# Patient Record
Sex: FEMALE | Race: WHITE | NOT HISPANIC OR LATINO | URBAN - METROPOLITAN AREA
[De-identification: names, ages, dates, MRNs, and addresses within clinical notes are randomized per-mention and may not be internally consistent; named-entity substitution may affect disease eponyms.]

---

## 2017-02-21 NOTE — PATIENT DISCUSSION
(H00.12) Chalazion right lower eyelid - Assesment : Examination revealed Chalazion RLL- not improving. - Plan : Chalazion I+ D performed today in minor surgery. Monitor for changes. Pt to call with increased symptoms, decreased vision, signs of infection, or other problems. Maxitrol graciela to RLL QHS x 4 nights. RTC as scheduled, or sooner for problems.

## 2017-06-30 NOTE — PATIENT DISCUSSION
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 - Plan : Monitor for changes.

## 2017-06-30 NOTE — PATIENT DISCUSSION
(T15.11XA) Foreign body in conjunctival sac, right eye, init encntr - Assesment : Examination revealed foreign body OD. Patient has dog at home. - Plan : Pet hair removed OD at slit lamp with q tip. Monitor for changes.

## 2017-06-30 NOTE — PATIENT DISCUSSION
(H10.45) Other chronic allergic conjunctivitis - Assesment : Examination revealed Allergic Conjunctivitis OU. - Plan : Recommend Alrex BID OU for 1 month and artificial tears 3-4 times daily OU to help wash out allergens. Monitor for changes. Keep scheduled appt.

## 2017-07-19 NOTE — PATIENT DISCUSSION
(O14.162) Keratoconjunct sicca, not specified as Sjogren's, bilateral - Assesment : Examination revealed Dry Eye Syndrome - Plan : Monitor for changes. ATs recommended daily.

## 2017-07-19 NOTE — PATIENT DISCUSSION
(H40.013) Open angle with borderline findings, low risk, bilateral - Assesment : Examination revealed suspicion for Open Angle Glaucoma. - Plan : Monitor for IOP and NFL changes with visits and testing. Advised patient to call our office when decreased vision or increased eye pain. RTC in 1 year for Exam and OCT ONH, sooner if problems or changes occur.

## 2017-07-19 NOTE — PATIENT DISCUSSION
(H25.13) Age-related nuclear cataract, bilateral - Assesment : Examination revealed cataract. - Plan : Monitor for changes. Advised patient to call our office with decreased vision or increased symptoms.

## 2017-08-30 ENCOUNTER — IMPORTED ENCOUNTER (OUTPATIENT)
Dept: URBAN - METROPOLITAN AREA CLINIC 1 | Facility: CLINIC | Age: 62
End: 2017-08-30

## 2017-08-30 PROBLEM — H25.813: Noted: 2017-08-30

## 2017-08-30 PROBLEM — H04.123: Noted: 2017-08-30

## 2017-08-30 PROBLEM — H43.811: Noted: 2017-08-30

## 2017-08-30 PROBLEM — H16.143: Noted: 2017-08-30

## 2017-08-30 PROBLEM — H40.023: Noted: 2017-08-30

## 2017-08-30 PROCEDURE — 92015 DETERMINE REFRACTIVE STATE: CPT

## 2017-08-30 PROCEDURE — 92014 COMPRE OPH EXAM EST PT 1/>: CPT

## 2017-08-30 PROCEDURE — 92133 CPTRZD OPH DX IMG PST SGM ON: CPT

## 2017-08-30 NOTE — PATIENT DISCUSSION
1.  LTG Suspect OU (CD 0.65/0.75): Family hx. OCT stable normal OD minimal temporal RNFL thinning OS. Patient is considered high risk. Condition was discussed with patient and patient understands. Will continue to monitor patient for any progression in condition. Patient was advised to call us with any problems questions or concerns. 2.  TAMMIE w/ PEK OU-The use/continuation of artificial tears were recommended. 3.  Cataract OU: Observe for now without intervention. The patient was advised to contact us if any change or worsening of vision4. PVD w/o Tear OD - RD precautions. MRX for glasses givenReturn for an appointment in 1 week cc with Dr. Domitila Celestin. Return for an appointment in 6 months 10/HVF with Dr. Domitila Celestin.

## 2017-09-05 ENCOUNTER — IMPORTED ENCOUNTER (OUTPATIENT)
Dept: URBAN - METROPOLITAN AREA CLINIC 1 | Facility: CLINIC | Age: 62
End: 2017-09-05

## 2018-01-08 NOTE — PATIENT DISCUSSION
(H10.45) Other chronic allergic conjunctivitis - Assesment : Hx of Allergic Conjunctivitis OU. Allergy testing today. Pt reports some improvement in symptoms. - Plan : Recommended OTC Zaditor to use prn for itching. ATs 2-4 times per day and prn. Refresh coupon given today. RTC as scheduled, sooner if problems or changes.

## 2018-01-08 NOTE — PATIENT DISCUSSION
(H00.015) Hordeolum externum left lower eyelid - Assesment : Examination revealed Hordeolum Externum LLL. - Plan : Start Erythromycin graciela to LLL bid OS for 1 week. E-Rx sent o pharmacy. Hot compresses as often as possible. Call if worsens, does not improve or new symptoms or vision changes occur.

## 2018-09-12 ENCOUNTER — IMPORTED ENCOUNTER (OUTPATIENT)
Dept: URBAN - METROPOLITAN AREA CLINIC 1 | Facility: CLINIC | Age: 63
End: 2018-09-12

## 2018-09-12 PROBLEM — H40.023: Noted: 2018-09-12

## 2018-09-12 PROBLEM — H25.813: Noted: 2018-09-12

## 2018-09-12 PROBLEM — H43.811: Noted: 2018-09-12

## 2018-09-12 PROBLEM — H04.123: Noted: 2018-09-12

## 2018-09-12 PROBLEM — H16.143: Noted: 2018-09-12

## 2018-09-12 PROCEDURE — 92014 COMPRE OPH EXAM EST PT 1/>: CPT

## 2018-09-12 NOTE — PATIENT DISCUSSION
1.  LTG Suspect OU (CD 0.65/0.75): Family hx. Patient is considered high risk. Condition was discussed with patient and patient understands. Will continue to monitor patient for any progression in condition. Patient was advised to call us with any problems questions or concerns. 2.  TAMMIE w/ PEK OU- Recommend ATs TID OU routinely 3. Cataract OU: Observe for now without intervention. The patient was advised to contact us if any change or worsening of vision4. PVD w/o Tear OD - RD precautions. Return for an appointment in 1-2 week 40/cc with Dr. Trey Nagy. Return for an appointment in 1 year 30/OCT/HVF with Dr. Trey Nagy.

## 2018-09-17 ENCOUNTER — IMPORTED ENCOUNTER (OUTPATIENT)
Dept: URBAN - METROPOLITAN AREA CLINIC 1 | Facility: CLINIC | Age: 63
End: 2018-09-17

## 2018-09-17 PROBLEM — H52.4: Noted: 2018-09-17

## 2018-09-17 PROBLEM — H52.03: Noted: 2018-09-17

## 2018-09-17 PROCEDURE — S0621 ROUTINE OPHTHALMOLOGICAL EXA: HCPCS

## 2018-09-17 NOTE — PATIENT DISCUSSION
1.  Hyperopia OU -- Finalized Glasses MRx was given to patient today for corrective spectacles if indicated2. Presbyopia OU 3. LTG Suspect OU (CD: 0.65 / 0.75) -- IOP stable today at 13 OU. Positive Family h/o Glaucoma. Patient is considered high risk. Condition was discussed with patient and patient understands. Will continue to monitor patient for any progression in condition. Patient was advised to call us with any problems questions or concerns4. TAMMIE w/ PEK OU -- Recommend the frequent use of OTC AT's BID-QID OU5. Cataracts OU -- Observe for now without intervention. The patient was advised to contact us if any change or worsening of vision6. PVD w/o Tear OD -- RD precautions givenReturn for an appointment in 1 WK for a CC OU with Dr. Xena Perez. Trial CTL given to patient today (Checked BCVA w/ CTL: 20 / 25 & J1 OU).

## 2018-09-26 ENCOUNTER — IMPORTED ENCOUNTER (OUTPATIENT)
Dept: URBAN - METROPOLITAN AREA CLINIC 1 | Facility: CLINIC | Age: 63
End: 2018-09-26

## 2018-09-26 NOTE — PATIENT DISCUSSION
1.  CC today - Good fit good comfort. Changes made and finalized CTL RXReturn for an appointment in 1 year 40/cc with Dr. Jah Payan.

## 2019-10-07 ENCOUNTER — IMPORTED ENCOUNTER (OUTPATIENT)
Dept: URBAN - METROPOLITAN AREA CLINIC 1 | Facility: CLINIC | Age: 64
End: 2019-10-07

## 2019-10-07 PROBLEM — H04.123: Noted: 2019-10-07

## 2019-10-07 PROBLEM — H25.813: Noted: 2019-10-07

## 2019-10-07 PROBLEM — H16.143: Noted: 2019-10-07

## 2019-10-07 PROBLEM — H43.813: Noted: 2019-10-07

## 2019-10-07 PROBLEM — H40.013: Noted: 2019-10-07

## 2019-10-07 PROCEDURE — 92014 COMPRE OPH EXAM EST PT 1/>: CPT

## 2019-10-07 PROCEDURE — 92083 EXTENDED VISUAL FIELD XM: CPT

## 2019-10-07 PROCEDURE — 92133 CPTRZD OPH DX IMG PST SGM ON: CPT

## 2019-10-07 NOTE — PATIENT DISCUSSION
1.  LTG Suspect OU -- (CD 0.650.75) IOP stable. OCT/24-2 HVF WNL OU. Family hx. Patient is considered Low Risk. Condition was discussed with patient and patient understands. Will continue to monitor patient for any progression in condition. Patient was advised to call us with any problems questions or concerns. 2.  PVD OU -- New OS Old OD. Reassurance. RD precautions given. 3.  Cataract OU -- Observe for now without intervention. The patient was advised to contact us if any change or worsening of vision4. TAMMIE w/ PEK OU -- Recommend ATs TID OU routinely. Patient defers MRx today. Will return under vision plan with RBF. Return for Next Available appointment for 40/cc with Dr. Kelby Li.

## 2019-10-16 ENCOUNTER — IMPORTED ENCOUNTER (OUTPATIENT)
Dept: URBAN - METROPOLITAN AREA CLINIC 1 | Facility: CLINIC | Age: 64
End: 2019-10-16

## 2019-10-16 PROBLEM — H52.221: Noted: 2019-10-16

## 2019-10-16 PROBLEM — H52.4: Noted: 2019-10-16

## 2019-10-16 PROBLEM — H52.03: Noted: 2019-10-16

## 2019-10-16 PROCEDURE — S0621 ROUTINE OPHTHALMOLOGICAL EXA: HCPCS

## 2019-10-16 NOTE — PATIENT DISCUSSION
1.  Hyperopia -- Rx was given for corrective spectacles if indicated. 2.  [id number]. Cataract OU -- Observe. 4. TAMMIE w/ PEK OU -- Continue ATs TID OU routinely (Sample of Refresh Relieva given). May use Lumify PRN for redness. 5. LTG Suspect OU -- (CD 0.650.75) IOP stable. Past w/u negative. Family hx. Patient is considered Low Risk. 6.  H/o PVD OU -- New OS Old OD. Reassurance. RD precautions given. Finalized CTL Rx today. Return for an appointment in 1 year for a 40/cc with Dr. Radha Jaimes. Return for an appointment in 1 year for a 30/OCT/glare with Dr. Mira Reina.

## 2020-11-09 ENCOUNTER — IMPORTED ENCOUNTER (OUTPATIENT)
Dept: URBAN - METROPOLITAN AREA CLINIC 1 | Facility: CLINIC | Age: 65
End: 2020-11-09

## 2020-11-09 PROBLEM — H04.123: Noted: 2020-11-09

## 2020-11-09 PROBLEM — H43.813: Noted: 2020-11-09

## 2020-11-09 PROBLEM — H40.023: Noted: 2020-11-09

## 2020-11-09 PROBLEM — H16.143: Noted: 2020-11-09

## 2020-11-09 PROBLEM — H25.813: Noted: 2020-11-09

## 2020-11-09 PROCEDURE — 92014 COMPRE OPH EXAM EST PT 1/>: CPT

## 2020-11-09 PROCEDURE — 92133 CPTRZD OPH DX IMG PST SGM ON: CPT

## 2020-11-09 NOTE — PATIENT DISCUSSION
1.  LTG Suspect OU (CD 0.65/0.75): Slight progression shown on OCT. IOP stable 11 OU. Family hx (Grandmother). Patient is considered high risk. Condition was discussed with patient and patient understands. Will continue to monitor patient for any progression in condition. Patient was advised to call us with any problems questions or concerns. 2.  Cataract OU: Observe for now without intervention. The patient was advised to contact us if any change or worsening of vision3. TAMMIE w/ PEK OU- Recommend ATs TID OU routinely 4. PVD OU - Symptomatic. No holes tears or detachments noted on today's exam. RD precautions. 5.  H/o Ocular Migraine - Discussed triggers such as stress dietary component (caffeine MSG). Advised to follow up with PCP if symptoms reoccur/worsenReturn for an appointment in 1 month 40/cc with Dr. Tata Tucker. Return for an appointment in 6 months 10/HVF with Dr. Tata Tucker.

## 2020-11-11 ENCOUNTER — IMPORTED ENCOUNTER (OUTPATIENT)
Dept: URBAN - METROPOLITAN AREA CLINIC 1 | Facility: CLINIC | Age: 65
End: 2020-11-11

## 2020-11-11 PROBLEM — H52.4: Noted: 2020-11-11

## 2020-11-11 PROBLEM — H52.03: Noted: 2020-11-11

## 2020-11-11 PROCEDURE — S0621 ROUTINE OPHTHALMOLOGICAL EXA: HCPCS

## 2021-12-16 ENCOUNTER — IMPORTED ENCOUNTER (OUTPATIENT)
Dept: URBAN - METROPOLITAN AREA CLINIC 1 | Facility: CLINIC | Age: 66
End: 2021-12-16

## 2021-12-16 PROBLEM — H52.13: Noted: 2021-12-16

## 2021-12-16 PROBLEM — H52.03: Noted: 2021-12-16

## 2021-12-16 PROBLEM — H52.223: Noted: 2021-12-16

## 2021-12-16 PROBLEM — H52.4: Noted: 2021-12-16

## 2021-12-16 PROCEDURE — S0621 ROUTINE OPHTHALMOLOGICAL EXA: HCPCS

## 2021-12-16 NOTE — PATIENT DISCUSSION
1.  Hyperopia with Astigmatism OU -- Rx was given for corrective spectacles if indicated. 2.  [id number]. LTG Suspect OU (CD 0.65/0.75) -- IOP stable OU. Family hx (Grandmother). Patient is considered high risk. Condition was discussed with patient and patient understands. Will continue to monitor patient for any progression in condition. Patient was advised to call us with any problems questions or concerns. 4.  Cataract OU -- Observe for now without intervention. The patient was advised to contact us if any change or worsening of vision5. TAMMIE w/ PEK OU -- Recommend ATs TID OU routinely 6. PVD OU -- Stable. No holes tears or detachments noted on today's exam. RD precautions. 7.  H/o Ocular Migraine -- Discussed triggers such as stress dietary component (caffeine MSG). Advised to follow up with PCP if symptoms reoccur/worsenCTL Rx finalized and given to pt. Return for an appointment as scheduled with Dr. Eddy Campa.

## 2022-04-08 ASSESSMENT — VISUAL ACUITY
OD_SC: 20/20
OS_SC: 20/20
OD_SC: 20/25+1
OU_SC: 20/20
OS_SC: 20/25
OS_GLARE: 20/60
OS_CC: J1+
OD_CC: J1
OS_CC: J1+
OS_GLARE: 20/100
OS_CC: J1+
OS_SC: 20/20
OS_CC: J1
OS_CC: J1
OS_SC: 20/25+2
OS_SC: 20/20
OD_SC: 20/20
OD_SC: 20/20
OS_CC: J1
OU_CC: J1+-1
OD_CC: 20/50
OD_GLARE: 20/100
OS_SC: 20/30
OD_SC: 20/20
OD_CC: J1
OD_SC: 20/20-1
OS_SC: 20/25+1
OS_CC: J1
OU_CC: J1+
OD_CC: J1+
OD_CC: J1
OS_SC: 20/25
OD_CC: J1
OD_GLARE: 20/60
OS_CC: 20/50
OD_GLARE: 20/60
OS_GLARE: 20/60
OD_SC: 20/20
OD_SC: 20/30
OS_SC: 20/20-2
OD_CC: J1+
OD_SC: 20/25
OU_SC: 20/25+2

## 2022-04-08 ASSESSMENT — KERATOMETRY
OS_K2POWER_DIOPTERS: 45.50
OS_AXISANGLE2_DEGREES: 050
OS_K1POWER_DIOPTERS: 46.00
OS_AXISANGLE_DEGREES: 140
OD_K2POWER_DIOPTERS: 44.75
OD_AXISANGLE2_DEGREES: 108
OD_K1POWER_DIOPTERS: 45.75
OD_AXISANGLE_DEGREES: 018

## 2022-04-08 ASSESSMENT — TONOMETRY
OS_IOP_MMHG: 13
OD_IOP_MMHG: 13
OD_IOP_MMHG: 11
OS_IOP_MMHG: 11
OS_IOP_MMHG: 11
OS_IOP_MMHG: 13
OD_IOP_MMHG: 11
OD_IOP_MMHG: 13
OS_IOP_MMHG: 13
OS_IOP_MMHG: 11
OD_IOP_MMHG: 11
OD_IOP_MMHG: 11
OD_IOP_MMHG: 13
OS_IOP_MMHG: 13
OS_IOP_MMHG: 10
OD_IOP_MMHG: 13

## 2022-05-02 ENCOUNTER — COMPREHENSIVE EXAM (OUTPATIENT)
Dept: URBAN - METROPOLITAN AREA CLINIC 1 | Facility: CLINIC | Age: 67
End: 2022-05-02

## 2022-05-02 DIAGNOSIS — H16.143: ICD-10-CM

## 2022-05-02 DIAGNOSIS — H43.813: ICD-10-CM

## 2022-05-02 DIAGNOSIS — H40.023: ICD-10-CM

## 2022-05-02 DIAGNOSIS — H25.813: ICD-10-CM

## 2022-05-02 DIAGNOSIS — H04.123: ICD-10-CM

## 2022-05-02 DIAGNOSIS — M06.9: ICD-10-CM

## 2022-05-02 DIAGNOSIS — Z79.899: ICD-10-CM

## 2022-05-02 PROCEDURE — 99214 OFFICE O/P EST MOD 30 MIN: CPT

## 2022-05-02 PROCEDURE — 92015 DETERMINE REFRACTIVE STATE: CPT

## 2022-05-02 PROCEDURE — 92133 CPTRZD OPH DX IMG PST SGM ON: CPT

## 2022-05-02 ASSESSMENT — VISUAL ACUITY
OS_BAT: 20/70
OD_CC: 20/20
OD_BAT: 20/70
OD_CC: J1+
OS_CC: 20/20
OS_CC: J1+

## 2022-05-02 ASSESSMENT — TONOMETRY
OS_IOP_MMHG: 15
OD_IOP_MMHG: 15

## 2022-05-02 NOTE — PATIENT DISCUSSION
(CD 0.65/0.75) - IOP stable 15 OU. OCT w/o progression OU. (+) Family hx (Grandmother). Condition was discussed with patient and patient understands. Will continue to monitor patient for any progression in condition. Patient was advised to call us with any problems questions or concerns.

## 2022-05-02 NOTE — PATIENT DISCUSSION
plaquenil without evidence of Toxicity or Plaquenil retinopathy on High Risk Medication. [id number] Tameka Guerrero for patient to remain on Plaquenil.  HVF machine down today, will perform after phaco.

## 2022-05-02 NOTE — PATIENT DISCUSSION
Visually Significant secondary to glare, discussed the risks, benefits, alternatives, and limitations of cataract surgery. The patient stated a full understanding and a desire to proceed with the procedure. The patient will need to return for pre-op appointment with cataract measurements and to have any additional questions answered, and start pre-operative eye drops as directed. Schedule Phaco/pcl OS then OD.

## 2022-07-05 ENCOUNTER — PRE-OP/H&P (OUTPATIENT)
Dept: URBAN - METROPOLITAN AREA CLINIC 1 | Facility: CLINIC | Age: 67
End: 2022-07-05

## 2022-07-05 VITALS — HEIGHT: 55 IN | SYSTOLIC BLOOD PRESSURE: 119 MMHG | HEART RATE: 86 BPM | DIASTOLIC BLOOD PRESSURE: 97 MMHG

## 2022-07-05 DIAGNOSIS — H25.813: ICD-10-CM

## 2022-07-05 PROCEDURE — 92136 OPHTHALMIC BIOMETRY: CPT

## 2022-07-05 PROCEDURE — 92025 CPTRIZED CORNEAL TOPOGRAPHY: CPT

## 2022-07-05 PROCEDURE — 99499 UNLISTED E&M SERVICE: CPT

## 2022-07-05 ASSESSMENT — VISUAL ACUITY
OD_SC: 20/30
OS_CC: 20/20
OS_CC: J1+
OD_BAT: 20/70
OS_BAT: 20/70
OS_SC: 20/30
OD_CC: 20/20
OD_CC: J1+

## 2022-07-05 NOTE — PATIENT DISCUSSION
(CD 0.65/0.75) IOP stable 15 OU. (+) Family hx (Grandmother). Condition was discussed with patient and patient understands. Will continue to monitor patient for any progression in condition. Patient was advised to call us with any problems questions or concerns.

## 2022-07-05 NOTE — PATIENT DISCUSSION
Visually Significant Secondary to glare, discussed the risks, benefits, alternatives, and limitations of cataract surgery. The patient stated a full understanding and a desire to proceed with the procedure. Discussed with patient if post-op drops are more than $120 for all three combined when filling at their Pharmacy, please call our office to request generic substitutions. Patient came into pre-op appointment alone and lifestyle questioner completed. Patient understands she will need specs post-op to achieve best corrected vision.

## 2022-07-05 NOTE — PATIENT DISCUSSION
plaquenil without evidence of Toxicity or Plaquenil retinopathy on High Risk Medication. [facility] for patient to remain on Plaquenil.  HVF machine down today, will perform after phaco.

## 2022-07-13 ENCOUNTER — SURGERY/PROCEDURE (OUTPATIENT)
Dept: URBAN - METROPOLITAN AREA SURGERY 1 | Facility: SURGERY | Age: 67
End: 2022-07-13

## 2022-07-13 DIAGNOSIS — H25.812: ICD-10-CM

## 2022-07-13 PROCEDURE — 66984 XCAPSL CTRC RMVL W/O ECP: CPT

## 2022-07-13 NOTE — PATIENT DISCUSSION
plaquenil without evidence of Toxicity or Plaquenil retinopathy on High Risk Medication. [id number] Tameka Guerrero for patient to remain on Plaquenil.  HVF machine down today, will perform after phaco.

## 2022-07-14 ENCOUNTER — POST-OP (OUTPATIENT)
Dept: URBAN - METROPOLITAN AREA CLINIC 1 | Facility: CLINIC | Age: 67
End: 2022-07-14

## 2022-07-14 DIAGNOSIS — Z96.1: ICD-10-CM

## 2022-07-14 PROCEDURE — 99024 POSTOP FOLLOW-UP VISIT: CPT

## 2022-07-14 ASSESSMENT — VISUAL ACUITY: OS_SC: 20/20

## 2022-07-14 ASSESSMENT — TONOMETRY: OS_IOP_MMHG: 14

## 2022-07-14 NOTE — PATIENT DISCUSSION
plaquenil without evidence of Toxicity or Plaquenil retinopathy on High Risk Medication. [id number] Tameka Guerrero for patient to remain on Plaquenil.  HVF machine down today, will perform after phaco.

## 2022-07-22 ENCOUNTER — POST OP/EVAL OF SECOND EYE (OUTPATIENT)
Dept: URBAN - METROPOLITAN AREA CLINIC 1 | Facility: CLINIC | Age: 67
End: 2022-07-22

## 2022-07-22 VITALS
SYSTOLIC BLOOD PRESSURE: 132 MMHG | HEART RATE: 75 BPM | HEIGHT: 61 IN | BODY MASS INDEX: 35.3 KG/M2 | DIASTOLIC BLOOD PRESSURE: 90 MMHG | WEIGHT: 187 LBS

## 2022-07-22 DIAGNOSIS — H25.811: ICD-10-CM

## 2022-07-22 DIAGNOSIS — Z96.1: ICD-10-CM

## 2022-07-22 PROCEDURE — 99024 POSTOP FOLLOW-UP VISIT: CPT

## 2022-07-22 PROCEDURE — 92136 OPHTHALMIC BIOMETRY: CPT

## 2022-07-22 PROCEDURE — 92025 CPTRIZED CORNEAL TOPOGRAPHY: CPT

## 2022-07-22 ASSESSMENT — TONOMETRY: OS_IOP_MMHG: 13

## 2022-07-22 ASSESSMENT — VISUAL ACUITY: OS_SC: 20/20

## 2022-07-22 NOTE — PATIENT DISCUSSION
Cataract OD -- Visually Significant (Secondary to glare), discussed the risks, benefits, alternatives, and limitations of cataract surgery. The patient stated a full understanding and a desire to proceed with the procedure. Discussed with patient if post-op drops are more than $120 for all three combined when filling at their Pharmacy, please call our office to request generic substitutions. Phaco PCL OD (Standard/LenSx).

## 2022-07-22 NOTE — PATIENT DISCUSSION
plaquenil without evidence of Toxicity or Plaquenil retinopathy on High Risk Medication. [id number] Tameka Guerrero for patient to remain on Plaquenil.  HVF machine down today, will perform after phaco.

## 2022-11-07 ENCOUNTER — POST OP/EVAL OF SECOND EYE (OUTPATIENT)
Dept: URBAN - METROPOLITAN AREA CLINIC 1 | Facility: CLINIC | Age: 67
End: 2022-11-07

## 2022-11-07 VITALS
BODY MASS INDEX: 35.3 KG/M2 | HEIGHT: 61 IN | SYSTOLIC BLOOD PRESSURE: 121 MMHG | DIASTOLIC BLOOD PRESSURE: 75 MMHG | HEART RATE: 77 BPM | WEIGHT: 187 LBS

## 2022-11-07 DIAGNOSIS — Z96.1: ICD-10-CM

## 2022-11-07 DIAGNOSIS — H25.811: ICD-10-CM

## 2022-11-07 PROCEDURE — 99499 UNLISTED E&M SERVICE: CPT

## 2022-11-07 PROCEDURE — 92025 CPTRIZED CORNEAL TOPOGRAPHY: CPT

## 2022-11-07 PROCEDURE — 92136 OPHTHALMIC BIOMETRY: CPT

## 2022-11-07 ASSESSMENT — VISUAL ACUITY
OS_SC: 20/20
OD_BAT: 20/70
OD_SC: 20/80

## 2022-11-07 ASSESSMENT — TONOMETRY
OD_IOP_MMHG: 14
OS_IOP_MMHG: 14

## 2022-11-07 NOTE — PATIENT DISCUSSION
(CD 0.65/0.75) IOP stable. (+) Family hx (Grandmother). Condition was discussed with patient and patient understands. Will continue to monitor patient for any progression in condition. Patient was advised to call us with any problems questions or concerns.

## 2022-11-07 NOTE — PATIENT DISCUSSION
plaquenil without evidence of Toxicity or Plaquenil retinopathy on High Risk Medication. [id number] Tameka Guerrero for patient to remain on Plaquenil.  Will perform HVF after phaco.

## 2022-11-17 ENCOUNTER — POST-OP (OUTPATIENT)
Dept: URBAN - METROPOLITAN AREA CLINIC 1 | Facility: CLINIC | Age: 67
End: 2022-11-17

## 2022-11-17 DIAGNOSIS — Z96.1: ICD-10-CM

## 2022-11-17 PROCEDURE — 99024 POSTOP FOLLOW-UP VISIT: CPT

## 2022-11-17 ASSESSMENT — TONOMETRY: OD_IOP_MMHG: 21

## 2022-11-17 ASSESSMENT — VISUAL ACUITY: OD_SC: 20/20

## 2022-11-17 NOTE — PATIENT DISCUSSION
Use Pred Combo gtt through completion of PO gtt chart regimen/ Per our instructions given to patient.

## 2022-11-17 NOTE — PATIENT DISCUSSION
plaquenil without evidence of Toxicity or Plaquenil retinopathy on High Risk Medication. [id number] Tameka Guerrero for patient to remain on Plaquenil.  Will perform HVF after phaco.

## 2022-12-08 ENCOUNTER — POST-OP (OUTPATIENT)
Dept: URBAN - METROPOLITAN AREA CLINIC 1 | Facility: CLINIC | Age: 67
End: 2022-12-08

## 2022-12-08 PROCEDURE — 99024 POSTOP FOLLOW-UP VISIT: CPT

## 2022-12-08 ASSESSMENT — VISUAL ACUITY
OS_SC: 20/20
OD_SC: 20/30

## 2022-12-08 ASSESSMENT — TONOMETRY
OS_IOP_MMHG: 15
OD_IOP_MMHG: 15

## 2022-12-08 NOTE — PATIENT DISCUSSION
plaquenil without evidence of Toxicity or Plaquenil retinopathy on High Risk Medication. [id number] Tameka Guerrero for patient to remain on Plaquenil.  Will perform HVF after phaco.

## 2023-01-24 ENCOUNTER — EMERGENCY VISIT (OUTPATIENT)
Dept: URBAN - METROPOLITAN AREA CLINIC 1 | Facility: CLINIC | Age: 68
End: 2023-01-24

## 2023-01-24 DIAGNOSIS — G43.B0: ICD-10-CM

## 2023-01-24 PROCEDURE — 99213 OFFICE O/P EST LOW 20 MIN: CPT

## 2023-01-24 ASSESSMENT — VISUAL ACUITY
OS_CC: J1
OD_CC: J1
OS_SC: 20/25
OD_SC: 20/20

## 2023-01-24 ASSESSMENT — TONOMETRY
OD_IOP_MMHG: 15
OS_IOP_MMHG: 17

## 2023-01-24 NOTE — PATIENT DISCUSSION
plaquenil without evidence of Toxicity or Plaquenil retinopathy on High Risk Medication. [id number] Tameka Guerrero for patient to remain on Plaquenil.  Will perform HVF after phaco.

## 2024-05-30 ENCOUNTER — COMPREHENSIVE EXAM (OUTPATIENT)
Dept: URBAN - METROPOLITAN AREA CLINIC 1 | Facility: CLINIC | Age: 69
End: 2024-05-30

## 2024-05-30 DIAGNOSIS — H16.143: ICD-10-CM

## 2024-05-30 DIAGNOSIS — H43.813: ICD-10-CM

## 2024-05-30 DIAGNOSIS — H04.123: ICD-10-CM

## 2024-05-30 DIAGNOSIS — H35.363: ICD-10-CM

## 2024-05-30 DIAGNOSIS — H40.023: ICD-10-CM

## 2024-05-30 PROCEDURE — 92133 CPTRZD OPH DX IMG PST SGM ON: CPT

## 2024-05-30 PROCEDURE — 99214 OFFICE O/P EST MOD 30 MIN: CPT

## 2024-05-30 ASSESSMENT — VISUAL ACUITY
OS_SC: 20/20
OD_SC: J1
OD_SC: 20/20-2
OS_SC: J1

## 2024-05-30 ASSESSMENT — TONOMETRY
OS_IOP_MMHG: 15
OD_IOP_MMHG: 15